# Patient Record
Sex: MALE | Race: WHITE
[De-identification: names, ages, dates, MRNs, and addresses within clinical notes are randomized per-mention and may not be internally consistent; named-entity substitution may affect disease eponyms.]

---
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
## 2019-10-21 ENCOUNTER — HOSPITAL ENCOUNTER (EMERGENCY)
Dept: HOSPITAL 25 - UCEAST | Age: 19
Discharge: HOME | End: 2019-10-21
Payer: MEDICAID

## 2019-10-21 VITALS — DIASTOLIC BLOOD PRESSURE: 84 MMHG | SYSTOLIC BLOOD PRESSURE: 139 MMHG

## 2019-10-21 DIAGNOSIS — F84.0: ICD-10-CM

## 2019-10-21 DIAGNOSIS — J32.9: Primary | ICD-10-CM

## 2019-10-21 DIAGNOSIS — Z88.8: ICD-10-CM

## 2019-10-21 DIAGNOSIS — Z91.09: ICD-10-CM

## 2019-10-21 PROCEDURE — 99202 OFFICE O/P NEW SF 15 MIN: CPT

## 2019-10-21 PROCEDURE — G0463 HOSPITAL OUTPT CLINIC VISIT: HCPCS

## 2019-10-21 NOTE — XMS REPORT
Continuity of Care Document (CCD)

 Created on:2019



Patient:Trent Nguyen

Sex:Male

:2000

External Reference #:[id number]





Demographics







 Address  [address] [place] [address]

 

 Home Phone  [phone]

 

 Preferred Language  ru

 

 Marital Status  Not  or 

 

 Yarsani Affiliation  Unknown

 

 Race  White

 

 Ethnic Group  Not  or 









Author







 Name  Davina Rich DO (transmitted by agent of provider Mary Ann Richards)

 

 Address  [address] [place] [address]









Care Team Providers







 Name  Role  Phone

 

 Mile Alvarenga M.D. - Family Medicine  Care Team Information   [phone]
[phone]









Problems







 Active Problems  Provider  Date

 

 Autistic disorder of childhood onset  Christian English M.D.,FACP  Onset: 

 

 Seasonal allergic rhinitis  Christian English M.D.,FACP  Onset: 2017

 

 Self-harm  Christian English M.D.,FACP  Onset: 2017







Social History







 Type  Date  Description  Comments

 

 Birth Sex    Unknown  

 

 ETOH Use    Never used alcohol  

 

 Tobacco Use  Start: Unknown  Patient has never smoked  

 

 Smoking Status  Reviewed: 19  Patient has never smoked  







Allergies, Adverse Reactions, Alerts







 Active Allergies  Reaction  Severity  Comments  Date

 

 Benadryl      hyperactivity  2017







Medications







 Active Medications  SIG  Qnty  Indications  Ordering  Date



         Provider  

 

 Multi Vitamin Daily  1 by mouth every  90tabs    Mile Alvarenga MD  2018



   day        



 Tablets          



           

 

 Vitamin D  1 by mouth every  90caps    Christian AL  2018



        [address]  other day      WILLIAM English,FACP  



 Capsules          



           

 

 Fish Oil  Take One Capsule  30caps    Mile Alvarenga MD  2018



       1000mg Capsules  By Mouth Daily        



   (Supplement)        

 

 Doxycycline Hyclate  one tab twice a  60caps    Mile Alvarenga MD  2018



                  50mg  day routinely for        



 Capsules  acne        



           

 

 Levocetirizine  1 by mouth every  90tabs    Christian AL  08/15/2017



 Dihydrochloride  day      WILLIAM English,FACP  



              5mg          



 Tablets          



           

 

 Ear Wax Drops  to both ears qd  15ml    Christian AL  2017



            6.5%  for 3 days,      WILLIAM English,FACP  



 Solution  follow package        



   directions on        



   syringing ears        

 

 Klonopin  one in the  120tabs    Christian AL  



       0.5mg Tablets  morning, one at      WILLIAM English,FACP  



   3pm and two at        



   bedtime        

 

 Lamictal  one in Am      Unknown  



       25mg Tablets          



           

 

 Melatonin  1 tab by mouth      Unknown  



        3mg Capsules  every night at        



   bedtime        

 

 Aripiprazole  1/2 by mouth      Unknown  



           5mg Tablets  every day        



           







Immunizations







 CPT Code  Status  Date  Vaccine  Lot #

 

 [id number]  Given  2018  Influenza Virus Vaccine, Quadrivalent, Split,  [id number]



       Preservative Free  







Vital Signs







 Date  Vital  Result  Comment

 

 2019  3:08pm  Height  70 inches  5'10"









 Weight  184.31 lb  With shoes

 

 Heart Rate  75 /min  

 

 BP Systolic  133 mmHg  LA sitting

 

 BP Diastolic  82 mmHg  LA sitting

 

 Body Temperature  97.7 F  

 

 O2 % BldC Oximetry  97 %  

 

 BMI (Body Mass Index)  26.4 kg/m2  









 2019  2:49pm  Height  70 inches  5'10"









 Weight  188.50 lb  

 

 Heart Rate  74 /min  

 

 BP Systolic Sitting  140 mmHg  Rue reg cuff

 

 BP Diastolic Sitting  85 mmHg  Rue reg cuff

 

 O2 % BldC Oximetry  99 %  

 

 BMI (Body Mass Index)  27.0 kg/m2  







Results







 Test  Date  Facility  Test  Result  H/L  Range  Note

 

 CBC No Diff  2019  [facility]  White Blood  5.4 10^3/uL  
Normal  3.5-10.8  



     [address]  Count        



     [place] [address] [phone]          









 Red Blood Count  5.32 10^6/uL  Normal  4.18-5.48  

 

 Hemoglobin  15.9 g/dL  Normal  14.0-18.0  

 

 Hematocrit  46 %  Normal  42-52  

 

 Mean Corpuscular Volume  87 fL  Normal  80-94  

 

 Mean Corpuscular Hemoglobin  30 pg  Normal  27-31  

 

 Mean Corpuscular HGB Conc  34 g/dL  Normal  31-36  

 

 Red Cell Distribution Width  13 %  Normal  10.5-15  

 

 Platelet Count  258 10^3/uL  Normal  150-450  

 

 Mean Platelet Volume  7.7 fL  Normal  7.4-10.4  









 Laboratory test  2019  [facility]  Ammonia  50 mcmol/L  
Normal  16-53  



 finding    [address] [place] [address] [phone]          

 

 Comp Metabolic  2019  [facility]  Sodium  140 mmol/L  Normal  
135-145  



 Panel    [address] [place] [address] [phone]          









 Potassium  4.3 mmol/L  Normal  3.5-5.0  

 

 Chloride  106 mmol/L  Normal  101-111  

 

 Co2 Carbon Dioxide  29 mmol/L  Normal  22-32  

 

 Anion Gap  5 mmol/L  Normal  2-11  

 

 Glucose  85 mg/dL  Normal    

 

 Blood Urea Nitrogen  16 mg/dL  Normal  6-24  

 

 Creatinine  0.90 mg/dL  Normal  0.67-1.17  

 

 BUN/Creatinine Ratio  17.8  Normal  8-20  

 

 Calcium  10.2 mg/dL  Normal  8.6-10.3  

 

 Total Protein  7.1 g/dL  Normal  6.4-8.9  

 

 Albumin  4.8 g/dL  Normal  3.2-5.2  

 

 Globulin  2.3 g/dL  Normal  2-4  

 

 Albumin/Globulin Ratio  2.1  Normal  1-3  

 

 Total Bilirubin  0.40 mg/dL  Normal  0.2-1.0  

 

 Alkaline Phosphatase  81 U/L  Normal    

 

 Alt  33 U/L  Normal  7-52  

 

 Ast  17 U/L  Normal  13-39  

 

 Egfr Non-  108.7    >60  

 

 Egfr   131.5    >60  1









 Laboratory test  2019  [facility]  Amylase  35 U/L  Normal  29
-103  



 finding    [address] [place] [address] [phone]          









 Lipase  10 U/L  Low  11.0-82.0  

 

 Valproic Acid (Depakene)  < 13.0 g/mL  Low    

 

 TSH (Thyroid Stim Horm)  4.87 mcIU/mL  Normal  0.34-5.60  

 

 Prolactin  4.0 ng/mL  Normal  1.0-20.0  

 

 Vitamin D Total 25(Oh)  51.7 ng/mL  High  20-50  2

 

 Lamotrigine (Lamictal)  1.4 g/mL  Abnormal  2.5 - 15.0  3









 1  *******Because ethnic data is not always readily available,



   this report includes an eGFR for both -Americans and



   non- Americans.****



   The National Kidney Disease Education Program (NKDEP) does



   not endorse the use of the MDRD equation for patients that



   are not between the ages of 18 and 70, are pregnant, have



   extremes of body size, muscle mass, or nutritional status,



   or are non- or non-.



   According to the National Kidney Foundation, irrespective of



   diagnosis, the stage of the disease is based on the level of



   kidney function:



   Stage Description                      GFR(mL/min/1.73 m(2))



   1     Kidney damage with normal or decreased GFR       90



   2     Kidney damage with mild decrease in GFR          60-89



   3     Moderate decrease in GFR                         30-59



   4     Severe decrease in GFR                           15-29



   5     Kidney failure                       <15 (or dialysis)

 

 2  Total 25-Hydroxyvitamin D2 and D3 (25-OH-VitD)



   



   <10 ng/mL (severe deficiency)



   



   10-19 ng/mL (mild to moderate deficiency)



   



   20-50 ng/mL (optimum levels)



   



   51-80 ng/mL (increased risk of hypercalciuria)



   



   >80 ng/mL (toxicity possible)

 

 3  -------------------ADDITIONAL INFORMATION-------------------



   This test was developed and its performance characteristics



   determined by [facility] in a manner consistent with CLIA



   requirements. This test has not been cleared or approved by



   the U.S. Food and Drug Administration.



   Test Performed by:



   [facility]



   [address] [place] [address]







Procedures







 Description

 

 No Information Available







Medical Devices







 Description

 

 No Information Available







Encounters







 Type  Date  Location  Provider  Dx  Diagnosis

 

 Office Visit  2019  [facility]  Davina Rich,  R15.9  Full 
incontinence of



   3:00p  Medicine - Suite  DO    feces



     R      

 

 Office Visit  2019  [facility]  Mile Alvarenga MD  Z00.00  Encntr for 
general



   2:20p  Medicine - [facility]      adult medical exam



           w/o abnormal



           findings

 

 Office Visit  2019  [facility]  Mile Alvarenga MD  F84.0  Autistic 
disorder



   9:20a  Medicine - [facility]      









 F80.9  Developmental disorder of speech and language, unspecified







Assessments







 Date  Code  Description  Provider

 

 2019  R15.9  Full incontinence of feces  Davina Rich DO

 

 2019  Z00.00  Encounter for general adult medical examination  Mile Alvarenga MD



     without abno  

 

 2019  F84.0  Autistic disorder  Mile Alvarenga MD

 

 2019  F80.9  Developmental disorder of speech and language,  Mile Alvarenga MD



     unspecified  







Plan of Treatment

Future Appointment(s):2020 10:00 am - Mile Alvarenga MD at [facility] 
[facility] Davina Rich, DOR15.9 Full incontinence of feces



Functional Status







 Description

 

 No Information Available







Mental Status







 Description

 

 No Information Available







Referrals







 Description

 

 No Information Available

## 2019-10-21 NOTE — XMS REPORT
Continuity of Care Document (CCD)

 Created on:2019



Patient:Trent Nguyen

Sex:Male

:2000

External Reference #:[id number]





Demographics







 Address  [address] [facility] [address] [facility] [address]



   [place] [address]

 

 Home Phone  [phone]

 

 Preferred Language  en

 

 Marital Status  Not  or 

 

 Protestant Affiliation  Unknown

 

 Race  White

 

 Ethnic Group  Not  or 









Author







 Name  Bro Mujica M.D. (transmitted by agent of provider Michelle Plata)

 

 Address  [address] [place] [address]









Care Team Providers







 Name  Role  Phone

 

 John English MD  Care Team Information   [phone]









Problems







 Description

 

 No Information Available







Social History







 Type  Date  Description  Comments

 

 Birth Sex    Unknown  

 

 Tobacco Use  Start: Unknown  Never Smoked Cigarettes  

 

 ETOH Use    Denies alcohol use  

 

 Recreational Drug Use    Never Used Drugs  







Allergies, Adverse Reactions, Alerts







 Active Allergies  Reaction  Severity  Comments  Date

 

 Dust Mites        2017

 

 Cats        2017

 

 Benadryl        2017







Medications







 Active Medications  SIG  Qnty  Indications  Ordering Provider  Date

 

 Xyzal Allergy 24HR  1 by mouth every      Unknown  



                  5mg  day        



 Tablets          



           

 

 Klonopin  1 by mouth every 8      Unknown  



        2mg Tablets  hours as needed        



           

 

 Lamictal        Unknown  



        25mg Tablets          



           

 

 Flonase Allergy  1 intranasal puff      Unknown  



 Relief  twice a day        



      50mcg/Act          



 Suspension          



           

 

 Fish Oil  1 by mouth every      Unknown  



        1000mg  day        



 Capsules          



           

 

 Vitamin D3  daily otc      Unknown  



          [address]          



 Tablets          



           

 

 Depakote  as needed      Unknown  



        250mg Tablets          



 DR          

 

 Melatonin        Unknown  



         5mg Tablets          



           







Immunizations







 Description

 

 No Information Available







Vital Signs







 Date  Vital  Result  Comment

 

 10/02/2019 11:27am  Weight  184.00 lb  









 Height  72 inches  6'0"

 

 BMI (Body Mass Index)  25.0 kg/m2  

 

 BP Systolic  125 mmHg  

 

 BP Diastolic  81 mmHg  

 

 Heart Rate  70 /min  

 

 O2 % BldC Oximetry  100 %  

 

 Body Temperature  97.9 F  

 

 Pain Level  0  









 2019  1:05pm  Weight  187.00 lb  









 Height  72 inches  6'0"

 

 BMI (Body Mass Index)  25.4 kg/m2  

 

 BP Systolic  122 mmHg  

 

 BP Diastolic  69 mmHg  

 

 Heart Rate  75 /min  

 

 O2 % BldC Oximetry  98 %  

 

 Body Temperature  98.4 F  

 

 Pain Level  0  







Results







 Description

 

 No Information Available







Procedures







 Description

 

 No Information Available







Medical Devices







 Description

 

 No Information Available







Encounters







 Type  Date  Location  Provider  Dx  Diagnosis

 

 Office Visit  10/02/2019  Main Office  Bro Mujica M.D.  H61.23  Impacted 
cerumen,



   11:00a        bilateral









 J31.0  Chronic rhinitis







Assessments







 Date  Code  Description  Provider

 

 10/02/2019  H61.23  Impacted cerumen, bilateral  Bro Mujica M.D.

 

 10/02/2019  J31.0  Chronic rhinitis  Bro Mujica M.D.







Plan of Treatment

Future Appointment(s):2020  2:15 pm - Bro Mujica M.D. at Main Office



Functional Status







 Description

 

 No Information Available







Mental Status







 Description

 

 No Information Available







Referrals







 Description

 

 No Information Available

## 2019-10-21 NOTE — UC
Respiratory Complaint HPI





- HPI Summary


HPI Summary: 





20 yo male presents with sinus symptoms. He has severe autism and is largely non

-verbal and lives at a group home. Caretaker is with him today and tells me 

that over the last 2 days pt has had a runny nose, drainage from eyes, post 

nasal drip, and dry cough. No fevers. Has been taking tylenol OTC with no 

change. Has been eating and drinking well. 





- History of Current Complaint


Stated Complaint: CONGESTION RESP ISSUE


Time Seen by Provider: 10/21/19 14:34


Hx Obtained From: Family/Caretaker


Hx From Patient Unobtainable Due To: Altered Mental Status





- Allergies/Home Medications


Allergies/Adverse Reactions: 


 Allergies











Allergy/AdvReac Type Severity Reaction Status Date / Time


 


diphenhydramine Allergy  See Comment Verified 10/21/19 14:50





[From Benadryl]     


 


cats Allergy  Unknown Uncoded 10/21/19 14:50





   Reaction  





   Details  


 


dust mites Allergy  Unknown Uncoded 10/21/19 14:50





   Reaction  





   Details  











Home Medications: 


 Home Medications





ARIPiprazole [Abilify] 0.5 mg PO BID 10/21/19 [History Confirmed 10/21/19]











PMH/Surg Hx/FS Hx/Imm Hx





- Additional Past Medical History


Additional PMH: 





Autism


ADD





- Surgical History


Surgical History: None





- Family History


Known Family History: Positive: Unknown - patient has severe cognitive 

limitations and cannot verbalize PFH





- Social History


Occupation: Disabled


Lives: Group Home


Alcohol Use: None


Substance Use Type: None


Smoking Status (MU): Never Smoked Tobacco


Have You Smoked in the Last Year: No





- Immunization History


Most Recent Influenza Vaccination: NOT UTD


Vaccination Up to Date: Yes





Review of Systems


All Other Systems Reviewed And Are Negative: No


Constitutional: Positive: Negative


Skin: Positive: Negative


Eyes: Positive: Drainage


ENT: Positive: Nasal Discharge, Sinus Congestion


Respiratory: Positive: Cough


Cardiovascular: Positive: Negative


Gastrointestinal: Positive: Negative


Neurological: Positive: Negative


Psychological: Positive: Negative





Physical Exam





- Summary


Physical Exam Summary: 





 


GENERAL: NAD. WDWN. No pain distress.


SKIN: No rashes, sores, lesions, or open wounds.


HEENT:


            Head: AT/NC


            Eyes: EOM intact. Conjunctiva clear without inflammation or 

discharge.


            Ears: Hearing grossly normal. TMs intact, no bulging, erythema, or 

edema. 


            Nose: Nasal mucosa pink and moist. NTTP maxillary and frontal 

sinus. 


            Throat: Posterior oropharynx without exudates, erythema, or 

tonsillar enlargement.  Uvula midline.


NECK: Supple. Nontender. No lymphadenopathy. 


CHEST:  CTAB. No accessory muscle use. Breathing comfortably and in no distress.


CV:  RRR. Pulses intact. Cap refill <2seconds


NEURO: Alert. 


PSYCH: Age appropriate behavior.


Triage Information Reviewed: Yes


Vital Signs: 





Vital Signs:











Temp Pulse Resp BP Pulse Ox


 


 99.8 F   90   18   139/84   98 


 


 10/21/19 14:31  10/21/19 14:31  10/21/19 14:31  10/21/19 14:31  10/21/19 14:31











Vital Signs Reviewed: Yes





Respiratory Course/Dx





- Course


Course Of Treatment: 





Suspect viral illness, but given pt's non-verbal status, his symptoms are 

difficult to determine. Caretakers prefer that he be on anbx at this time.





- Differential Dx/Diagnosis


Provider Diagnosis: 


 Rhinosinusitis








Discharge ED





- Sign-Out/Discharge


Documenting (check all that apply): Patient Departure


All imaging exams completed and their final reports reviewed: No Studies





- Discharge Plan


Condition: Stable


Disposition: HOME


Prescriptions: 


Amoxicillin PO (*) [Amoxicillin 875 MG (*)] 875 mg PO BID #14 tab


Patient Education Materials:  Rhinosinusitis (ED)


Referrals: 


Mile Alvarenga MD [Primary Care Provider] - 


Additional Instructions: 


If you develop a fever, shortness of breath, chest pain, new or worsening 

symptoms - please call your PCP or go to the ED immediately.


 








- Billing Disposition and Condition


Condition: STABLE


Disposition: Home





- Attestation Statements


Provider Attestation: 





Per institutional requirements, I have reviewed the chart, however, I was not 

consulted specifically or made aware of this patient by the  midlevel provider.

  I did not personally evaluate, interact with , or disposition  this patient.